# Patient Record
Sex: FEMALE | Race: WHITE
[De-identification: names, ages, dates, MRNs, and addresses within clinical notes are randomized per-mention and may not be internally consistent; named-entity substitution may affect disease eponyms.]

---

## 2021-12-24 ENCOUNTER — HOSPITAL ENCOUNTER (EMERGENCY)
Dept: HOSPITAL 54 - ER | Age: 26
Discharge: HOME | End: 2021-12-24
Payer: MEDICAID

## 2021-12-24 VITALS — WEIGHT: 138 LBS | BODY MASS INDEX: 26.06 KG/M2 | HEIGHT: 61 IN

## 2021-12-24 VITALS — DIASTOLIC BLOOD PRESSURE: 65 MMHG | SYSTOLIC BLOOD PRESSURE: 119 MMHG

## 2021-12-24 DIAGNOSIS — U07.1: Primary | ICD-10-CM

## 2021-12-24 PROCEDURE — C9803 HOPD COVID-19 SPEC COLLECT: HCPCS

## 2021-12-24 PROCEDURE — 71045 X-RAY EXAM CHEST 1 VIEW: CPT

## 2021-12-24 PROCEDURE — 99285 EMERGENCY DEPT VISIT HI MDM: CPT

## 2021-12-24 PROCEDURE — 87804 INFLUENZA ASSAY W/OPTIC: CPT

## 2021-12-24 PROCEDURE — 93005 ELECTROCARDIOGRAM TRACING: CPT

## 2021-12-24 PROCEDURE — 87426 SARSCOV CORONAVIRUS AG IA: CPT

## 2021-12-24 NOTE — NUR
BIB SELF C/O CHEST TIGHTNESS 3/10 AND FATIGUE FOR 3 DAYS. OXYGEN SATURATION IN 
ROOM AIR IS AT 95%. RESPIRATION REGULAR AND UNLABORED. WILL CONTINUE TO MONITOR 
THE PATIENT.